# Patient Record
Sex: FEMALE | Race: WHITE | URBAN - METROPOLITAN AREA
[De-identification: names, ages, dates, MRNs, and addresses within clinical notes are randomized per-mention and may not be internally consistent; named-entity substitution may affect disease eponyms.]

---

## 2017-09-26 ENCOUNTER — TRANSFERRED RECORDS (OUTPATIENT)
Dept: HEALTH INFORMATION MANAGEMENT | Facility: CLINIC | Age: 42
End: 2017-09-26

## 2017-10-09 ENCOUNTER — TRANSFERRED RECORDS (OUTPATIENT)
Dept: HEALTH INFORMATION MANAGEMENT | Facility: CLINIC | Age: 42
End: 2017-10-09

## 2017-10-09 LAB
AST SERPL-CCNC: 27 U/L (ref 8–43)
CREAT SERPL-MCNC: 0.7 MG/DL (ref 0.6–1.1)
GFR SERPL CREATININE-BSD FRML MDRD: >60 ML/MIN/BSA
TSH SERPL-ACNC: 8 MIU/L (ref 0.3–4.2)

## 2017-11-03 ENCOUNTER — TRANSFERRED RECORDS (OUTPATIENT)
Dept: HEALTH INFORMATION MANAGEMENT | Facility: CLINIC | Age: 42
End: 2017-11-03

## 2018-07-09 ENCOUNTER — OFFICE VISIT (OUTPATIENT)
Dept: RHEUMATOLOGY | Facility: CLINIC | Age: 43
End: 2018-07-09
Payer: MEDICARE

## 2018-07-09 VITALS
BODY MASS INDEX: 37.84 KG/M2 | HEIGHT: 61 IN | WEIGHT: 200.4 LBS | OXYGEN SATURATION: 98 % | SYSTOLIC BLOOD PRESSURE: 130 MMHG | HEART RATE: 73 BPM | DIASTOLIC BLOOD PRESSURE: 86 MMHG | TEMPERATURE: 99.3 F

## 2018-07-09 DIAGNOSIS — M35.2 BEHCET'S DISEASE (H): Primary | ICD-10-CM

## 2018-07-09 PROBLEM — E03.9 HYPOTHYROIDISM: Status: ACTIVE | Noted: 2018-07-09

## 2018-07-09 PROBLEM — R00.2 PALPITATIONS: Status: ACTIVE | Noted: 2018-07-09

## 2018-07-09 PROBLEM — I47.10 PAROXYSMAL SUPRAVENTRICULAR TACHYCARDIA (H): Status: ACTIVE | Noted: 2018-07-09

## 2018-07-09 PROBLEM — E78.5 HYPERLIPIDEMIA: Status: ACTIVE | Noted: 2017-05-11

## 2018-07-09 LAB
BASOPHILS # BLD AUTO: 0 10E9/L (ref 0–0.2)
BASOPHILS NFR BLD AUTO: 0.1 %
DIFFERENTIAL METHOD BLD: NORMAL
EOSINOPHIL # BLD AUTO: 0.1 10E9/L (ref 0–0.7)
EOSINOPHIL NFR BLD AUTO: 1.6 %
ERYTHROCYTE [DISTWIDTH] IN BLOOD BY AUTOMATED COUNT: 12.7 % (ref 10–15)
ERYTHROCYTE [SEDIMENTATION RATE] IN BLOOD BY WESTERGREN METHOD: 13 MM/H (ref 0–20)
HCT VFR BLD AUTO: 43.1 % (ref 35–47)
HGB BLD-MCNC: 14.3 G/DL (ref 11.7–15.7)
LYMPHOCYTES # BLD AUTO: 2.2 10E9/L (ref 0.8–5.3)
LYMPHOCYTES NFR BLD AUTO: 32.9 %
MCH RBC QN AUTO: 29.7 PG (ref 26.5–33)
MCHC RBC AUTO-ENTMCNC: 33.2 G/DL (ref 31.5–36.5)
MCV RBC AUTO: 89 FL (ref 78–100)
MONOCYTES # BLD AUTO: 0.6 10E9/L (ref 0–1.3)
MONOCYTES NFR BLD AUTO: 9.1 %
NEUTROPHILS # BLD AUTO: 3.8 10E9/L (ref 1.6–8.3)
NEUTROPHILS NFR BLD AUTO: 56.3 %
PLATELET # BLD AUTO: 279 10E9/L (ref 150–450)
RBC # BLD AUTO: 4.82 10E12/L (ref 3.8–5.2)
WBC # BLD AUTO: 6.8 10E9/L (ref 4–11)

## 2018-07-09 PROCEDURE — 36415 COLL VENOUS BLD VENIPUNCTURE: CPT | Performed by: INTERNAL MEDICINE

## 2018-07-09 PROCEDURE — 86140 C-REACTIVE PROTEIN: CPT | Performed by: INTERNAL MEDICINE

## 2018-07-09 PROCEDURE — 99204 OFFICE O/P NEW MOD 45 MIN: CPT | Performed by: INTERNAL MEDICINE

## 2018-07-09 PROCEDURE — 84460 ALANINE AMINO (ALT) (SGPT): CPT | Performed by: INTERNAL MEDICINE

## 2018-07-09 PROCEDURE — 85025 COMPLETE CBC W/AUTO DIFF WBC: CPT | Performed by: INTERNAL MEDICINE

## 2018-07-09 PROCEDURE — 84450 TRANSFERASE (AST) (SGOT): CPT | Performed by: INTERNAL MEDICINE

## 2018-07-09 PROCEDURE — 82565 ASSAY OF CREATININE: CPT | Performed by: INTERNAL MEDICINE

## 2018-07-09 PROCEDURE — 85652 RBC SED RATE AUTOMATED: CPT | Performed by: INTERNAL MEDICINE

## 2018-07-09 RX ORDER — COLCHICINE 0.6 MG/1
TABLET ORAL
COMMUNITY
Start: 2018-02-12 | End: 2018-07-09

## 2018-07-09 RX ORDER — CHLORAL HYDRATE 500 MG
2000 CAPSULE ORAL
COMMUNITY
Start: 2017-05-11

## 2018-07-09 RX ORDER — LEVOTHYROXINE SODIUM 137 UG/1
137 TABLET ORAL
COMMUNITY
Start: 2018-02-13

## 2018-07-09 RX ORDER — PREGABALIN 75 MG/1
150 CAPSULE ORAL
COMMUNITY
Start: 2018-02-19

## 2018-07-09 RX ORDER — OXYCODONE AND ACETAMINOPHEN 10; 325 MG/1; MG/1
TABLET ORAL
COMMUNITY
Start: 2017-04-15

## 2018-07-09 RX ORDER — MODAFINIL 200 MG/1
200 TABLET ORAL
COMMUNITY
Start: 2017-08-22 | End: 2018-07-09

## 2018-07-09 RX ORDER — METAXALONE 800 MG/1
800 TABLET ORAL
COMMUNITY

## 2018-07-09 RX ORDER — ONDANSETRON 8 MG/1
TABLET, FILM COATED ORAL
COMMUNITY
Start: 2016-12-23

## 2018-07-09 RX ORDER — COLCHICINE 0.6 MG/1
0.6 TABLET ORAL DAILY
Qty: 3 TABLET | COMMUNITY
Start: 2018-07-09 | End: 2018-07-09

## 2018-07-09 RX ORDER — TRAMADOL HYDROCHLORIDE 50 MG/1
50 TABLET ORAL
COMMUNITY
Start: 2014-06-12

## 2018-07-09 RX ORDER — NORETHINDRONE ACETATE 5 MG
5 TABLET ORAL
COMMUNITY
Start: 2017-12-08

## 2018-07-09 RX ORDER — COLCHICINE 0.6 MG/1
0.6 TABLET ORAL 2 TIMES DAILY
Qty: 180 TABLET | Refills: 0 | COMMUNITY
Start: 2018-07-09

## 2018-07-09 RX ORDER — FENTANYL 25 UG/1
PATCH TRANSDERMAL
COMMUNITY
Start: 2017-04-15

## 2018-07-09 NOTE — NURSING NOTE
"No chief complaint on file.      Initial /86 (BP Location: Right arm, Patient Position: Chair, Cuff Size: Adult Large)  Pulse 73  Temp 99.3  F (37.4  C) (Oral)  Ht 1.549 m (5' 1\")  Wt 90.9 kg (200 lb 6.4 oz)  SpO2 98%  BMI 37.87 kg/m2 Estimated body mass index is 37.87 kg/(m^2) as calculated from the following:    Height as of this encounter: 1.549 m (5' 1\").    Weight as of this encounter: 90.9 kg (200 lb 6.4 oz).  Medication Reconciliation: complete    Have you ever seen a rheumatologist Yes  Who Dr. Joseph and  Dr Sanchez When (Jake Oct 2016) and Dr. Sanchez  11/2017   Joint pain history  Onset: Behcet's Disease has  Been 10 years Dr. Joseph retired looking for provider for maintenance purposes. Wakes up at night from numbness and nerve pain   Involved joints: all joints that moves around especially knees, hips and ankles  Pain scale:  5/10 with medication 9/10 without medication      Wakes the patient from sleep : Yes sometimes   Morning stiffness:Yes for 60  minutes  Meds used:Oxycoden, tramadol, fentanyl, skelaxin, and colchicine for ulcers and helps lessen duration of ulcers, lyrica as needed for nerve pain at night- groggy for nerve pain     Interim history  Since last visit:  1. Infections - No  2. New symptoms/medical problem - Yes low grade fever 99 last month and increased nerve pain, and pain on left side sometimes nerve, joints and sometimes muscles and lymph nodes. Waiting to schedule the Neurologist wanted to see you first   3. Any side effects from Rheum medications -Lyrica causes grogginess   3. ER visits/Hospitalizations/surgeries - No  4. Last PCP visit: Dr. Clement Nicole 2/12/18 at [facility]   Wt Readings from Last 4 Encounters:   07/09/18 90.9 kg (200 lb 6.4 oz)     BP Readings from Last 3 Encounters:   07/09/18 130/86       "

## 2018-07-09 NOTE — PROGRESS NOTES
[place] - Rheumatology Clinic Visit     Fozia Mclaughlin MRN# [id number]   YOB: 1975    Primary care provider: No primary care provider on file.  Jul 9, 2018          Assessment and Plan:   # Recurrent mouth and genital ulcers (since about age 17); fevers; joint pains; with diagnosis of Behcet's syndrome (Dx by Dr. Oseguera/Dr. Fleming 2010)  # Chronic colchicine therapy since 2010  # Fibromyalgia  # H/o trigeminal neuralgia  # H/o Grave's disease with previous radioactive iodine treatment; # Strongly Positive EFRAIN  # Indeterminate pulmonary nodules  # H/o tachycardia s/p ablation  # Chronic narcotic use    Lab results per Dr. Sanchez at [facility]:  RF, CCP and ANCA negative.  WAYNE , dsDNA antibody negative in past.     Nuclear joint scan: minimal periarticular uptake around hips.     Birth control pills has helped with monthly menstruation related joint pains and pelvic pain.     Chronic narcotic use (fentanyl, oxycodone,tramadol) for widespread joint and muscle pain since about 2009. Followed by [facility] pain clinic.     Last dose of prednisone and methotrexate (subcut) around 2016. Had side effects from these meds. Hydroxychloroquine was tried in the past; Side effects from that too.     Current disease activity: In the last 3 months, two mouth and vaginal sores episodes. But overall fatigue is better in the last few months. Colchicine 0.6mg PO daily. Increase colchicine to 0.6mg PO BID. If colchicine does not control her disease well, then otezla is an option.   She had used prednisone or medrol dose corinne PRN for flare ups. Patient do gets side effects of steroids.       The labs from patient records are reviewed.     I will be back in touch with the patient through mychart/letter when results are available.     Patient agrees with the above mentioned treatment plan.       There is no immunization history on file for this patient.    Orders Placed This Encounter   Procedures     CBC with platelets  differential     AST     ALT     Creatinine     Erythrocyte sedimentation rate auto     CRP inflammation       Return in about 6 weeks (around 8/20/2018).    Medications Discontinued During This Encounter   Medication Reason     colchicine (COLCYRS) 0.6 MG tablet Reorder     colchicine (COLCYRS) 0.6 MG tablet Reorder     modafinil (PROVIGIL) 200 MG tablet      Current Outpatient Prescriptions   Medication Sig Dispense Refill     Ca Carbonate-Mag Hydroxide 675-135 MG CHEW        colchicine (COLCYRS) 0.6 MG tablet Take 1 tablet (0.6 mg) by mouth 2 times daily 180 tablet 0     fentaNYL (DURAGESIC) 25 mcg/hr 72 hr patch APPLY 1 PATCH BY TRANSDERMAL ROUTE EVERY 48 HOURS       levothyroxine (SYNTHROID/LEVOTHROID) 137 MCG tablet Take 137 mcg by mouth       metaxalone (SKELAXIN) 800 MG tablet Take 800 mg by mouth       Misc. Devices (QUAD CANE) MISC Dispense one cane for home use.  Diagnosis: imbalance  781.2       norethindrone (AYGESTIN) 5 MG tablet Take 5 mg by mouth       Omega-3 1000 MG CAPS Take 2,000 mg by mouth       ondansetron (ZOFRAN) 8 MG tablet        oxyCODONE-acetaminophen (PERCOCET)  MG per tablet TAKE 1 TO 2 TABLETS BY MOUTH EVERY 4-6 HOURS AS NEEDED MAX 8/DAY       pregabalin (LYRICA) 75 MG capsule Take 150 mg by mouth       traMADol (ULTRAM) 50 MG tablet Take 50 mg by mouth       Sidney Miller MD  [facility]          Active Problem List:     Patient Active Problem List    Diagnosis Date Noted     Palpitations 07/09/2018     Priority: Medium     Paroxysmal supraventricular tachycardia (H) 07/09/2018     Priority: Medium     Hypothyroidism 07/09/2018     Priority: Medium     Hyperlipidemia 05/11/2017     Priority: Medium     Lung nodule < 6cm on CT 11/18/2015     Priority: Medium     Behcet's syndrome (H) 11/21/2012     Priority: Medium     SOB (shortness of breath) 03/08/2012     Priority: Medium     Overview:   Likely due to Vocal Cord Dysfunction       Vitamin D deficiency  03/08/2012     Priority: Medium     Overview:   Formatting of this note may be different from the original.  VITAMIN D TOTAL (ng/mL)   Date Value   8/30/2011 22.4*   8/2/2011 25.2*   4/27/2011 22.3*        Carpal tunnel syndrome 01/04/2008     Priority: Medium     Myalgia and myositis 01/04/2008     Priority: Medium     Chest pain 10/02/2007     Priority: Medium     Other premature beats 08/08/2007     Priority: Medium          History of Present Illness:   No chief complaint on file.    July 9, 2018  Have you ever seen a rheumatologist Yes  Who Dr. Joseph and  Dr Sanchez When (Jake Oct 2016) and Dr. Sanchez  11/2017   Joint pain history  Onset: Behcet's Disease has  Been 10 years Dr. Joseph retired looking for provider for maintenance purposes. Wakes up at night from numbness and nerve pain   Involved joints: all joints that moves around especially knees, hips and ankles  Pain scale:  5/10 with medication 9/10 without medication      Wakes the patient from sleep : Yes sometimes   Morning stiffness:Yes for 60  minutes  Meds used:Oxycoden, tramadol, fentanyl, skelaxin, and colchicine for ulcers and helps lessen duration of ulcers, lyrica as needed for nerve pain at night- groggy for nerve pain      Interim history  Since last visit:  1. Infections - No  2. New symptoms/medical problem - Yes low grade fever 99 last month and increased nerve pain, and pain on left side sometimes nerve, joints and sometimes muscles and lymph nodes. Waiting to schedule the Neurologist wanted to see you first   3. Any side effects from Rheum medications -Lyrica causes grogginess   3. ER visits/Hospitalizations/surgeries - No  4. Last PCP visit: Dr. Clement Nicole 2/12/18 at [facility]     Wt Readings from Last 4 Encounters:   07/09/18 90.9 kg (200 lb 6.4 oz)     Low grade fevers +ve on and off   skin photosensitivity  Dry mouth  shortness of breath, cough, chest pain - on and off    No h/o  rash, swollen glands  No  family or personal history of psoriasis, ulcerative colitis or chron's disease. No h/o iritis.   Patient denies any raynauds  No h/o persistent vomiting, diarrhea, abdominal pain  No h/o hematochezia, hematuria, hemoptysis  No h/o seizures  No h/o cancer    BP Readings from Last 3 Encounters:   07/09/18 130/86            Review of Systems:   Complete ROS negative except for symptoms mentioned in the HPI          Past Medical History:     Patient Active Problem List   Diagnosis     Behcet's syndrome (H)     Carpal tunnel syndrome     Chest pain     Myalgia and myositis     Hyperlipidemia     Lung nodule < 6cm on CT     Other premature beats     Palpitations     Paroxysmal supraventricular tachycardia (H)     SOB (shortness of breath)     Hypothyroidism     Vitamin D deficiency            Social History:     Social History     Occupational History     Not on file.     Social History Main Topics     Smoking status: Former Smoker     Smokeless tobacco: Never Used     Alcohol use Not on file     Drug use: Not on file     Sexual activity: Not on file            Family History:     Family History   Problem Relation Age of Onset     Arthritis Mother      Myocardial Infarction Father      HEART DISEASE Father      Cancer Paternal Grandmother      unknown     HEART DISEASE Paternal Grandfather             Allergies:     Allergies   Allergen Reactions     Allergy Decongestant Anxiety and Other (See Comments)     DO NOT GIVE     Diphenhydramine      Other reaction(s): Other (see comments)  w/Pseudophed- Anxiety     Hydrocodone-Acetaminophen Nausea     Naproxen Hives     Pseudoephedrine      Other reaction(s): Other (see comments)  w/ Diphenhydramine- Anxiety            Medications:     Current Outpatient Prescriptions   Medication Sig Dispense Refill     Ca Carbonate-Mag Hydroxide 675-135 MG CHEW        colchicine (COLCYRS) 0.6 MG tablet Take 1 tablet (0.6 mg) by mouth 2 times daily 180 tablet 0     fentaNYL (DURAGESIC) 25  "mcg/hr 72 hr patch APPLY 1 PATCH BY TRANSDERMAL ROUTE EVERY 48 HOURS       levothyroxine (SYNTHROID/LEVOTHROID) 137 MCG tablet Take 137 mcg by mouth       metaxalone (SKELAXIN) 800 MG tablet Take 800 mg by mouth       Misc. Devices (QUAD CANE) MISC Dispense one cane for home use.  Diagnosis: imbalance  781.2       norethindrone (AYGESTIN) 5 MG tablet Take 5 mg by mouth       Omega-3 1000 MG CAPS Take 2,000 mg by mouth       ondansetron (ZOFRAN) 8 MG tablet        oxyCODONE-acetaminophen (PERCOCET)  MG per tablet TAKE 1 TO 2 TABLETS BY MOUTH EVERY 4-6 HOURS AS NEEDED MAX 8/DAY       pregabalin (LYRICA) 75 MG capsule Take 150 mg by mouth       traMADol (ULTRAM) 50 MG tablet Take 50 mg by mouth              Physical Exam:   Blood pressure 130/86, pulse 73, temperature 99.3  F (37.4  C), temperature source Oral, height 1.549 m (5' 1\"), weight 90.9 kg (200 lb 6.4 oz), SpO2 98 %.  Wt Readings from Last 4 Encounters:   07/09/18 90.9 kg (200 lb 6.4 oz)       Constitutional: well-developed, appearing stated age; cooperative  Eyes: normal conjunctiva, sclera  ENT: nl external ears, nose, lips.No mucous membrane lesions, normal saliva pool  Neck: no cervical lymphadenopathy; mild tenderness in lower end of sternoclavicular muscles  Resp: lungs clear to auscultation in the bases,   CV: RRR, no added sounds  GI: Abdomen soft and no tenderness  : not tested  Lymph: no cervical, supraclavicular or epitrochlear nodes  MS: All shoulder, elbow, wrist, MCP/PIP/DIP, hip, knee, ankle, and foot MTP/IP joints were examined and  found without active synovitis or major deformity. Full ROM.  No dactylitis,  tenosynovitis, enthesopathy.  Skin: no rash in exposed areas  Psych: nl judgement, orientation, memory, affect.         Data:         Sidney Miller MD    [facility]    "

## 2018-07-09 NOTE — MR AVS SNAPSHOT
"              After Visit Summary   2018    Fozia Mclaughlin    MRN: [id number]           Patient Information     Date Of Birth          1975        Visit Information        Provider Department      2018 2:00 PM Sidney Miller MD [facility]        Today's Diagnoses     Behcet's disease (H)    -  1       Follow-ups after your visit        Follow-up notes from your care team     Return in about 6 weeks (around 2018).      Who to contact     If you have questions or need follow up information about today's clinic visit or your schedule please contact [facility] directly at [phone].  Normal or non-critical lab and imaging results will be communicated to you by MyChart, letter or phone within 4 business days after the clinic has received the results. If you do not hear from us within 7 days, please contact the clinic through MyChart or phone. If you have a critical or abnormal lab result, we will notify you by phone as soon as possible.  Submit refill requests through [employer] or call your pharmacy and they will forward the refill request to us. Please allow 3 business days for your refill to be completed.          Additional Information About Your Visit        MyChart Information     [email or web address] lets you send messages to your doctor, view your test results, renew your prescriptions, schedule appointments and more. To sign up, go to [email or web address] . Click on \"Log in\" on the left side of the screen, which will take you to the Welcome page. Then click on \"Sign up Now\" on the right side of the page.     You will be asked to enter the access code listed below, as well as some personal information. Please follow the directions to create your username and password.     Your access code is: [email or web address]  Expires: 10/7/2018  1:50 PM     Your access code will  in 90 days. If you need help or a new code, please call your [facility] or [phone].        Care " "EveryWhere ID     This is your Care EveryWhere ID. This could be used by other organizations to access your [facility] medical records  [id number]        Your Vitals Were     Pulse Temperature Height Pulse Oximetry BMI (Body Mass Index)       73 99.3  F (37.4  C) (Oral) 1.549 m (5' 1\") 98% 37.87 kg/m2        Blood Pressure from Last 3 Encounters:   07/09/18 130/86    Weight from Last 3 Encounters:   07/09/18 90.9 kg (200 lb 6.4 oz)              We Performed the Following     ALT     AST     CBC with platelets differential     Creatinine     CRP inflammation     Erythrocyte sedimentation rate auto          Today's Medication Changes          These changes are accurate as of 7/9/18  3:23 PM.  If you have any questions, ask your nurse or doctor.               Start taking these medicines.        Dose/Directions    colchicine 0.6 MG tablet   Commonly known as:  COLCYRS   Started by:  Sidney Miller MD        Dose:  0.6 mg   Take 1 tablet (0.6 mg) by mouth 2 times daily   Quantity:  180 tablet   Refills:  0         Stop taking these medicines if you haven't already. Please contact your care team if you have questions.     modafinil 200 MG tablet   Commonly known as:  PROVIGIL   Stopped by:  Sidney Miller MD                   Information about OPIOIDS     PRESCRIPTION OPIOIDS: WHAT YOU NEED TO KNOW   We gave you an opioid (narcotic) pain medicine. It is important to manage your pain, but opioids are not always the best choice. You should first try all the other options your care team gave you. Take this medicine for as short a time (and as few doses) as possible.     These medicines have risks:    DO NOT drive when on new or higher doses of pain medicine. These medicines can affect your alertness and reaction times, and you could be arrested for driving under the influence (DUI). If you need to use opioids long-term, talk to your care team about driving.    DO NOT operate heave machinery    DO NOT " do any other dangerous activities while taking these medicines.     DO NOT drink any alcohol while taking these medicines.      If the opioid prescribed includes acetaminophen, DO NOT take with any other medicines that contain acetaminophen. Read all labels carefully. Look for the word  acetaminophen  or  Tylenol.  Ask your pharmacist if you have questions or are unsure.    You can get addicted to pain medicines, especially if you have a history of addiction (chemical, alcohol or substance dependence). Talk to your care team about ways to reduce this risk.    Store your pills in a secure place, locked if possible. We will not replace any lost or stolen medicine. If you don t finish your medicine, please throw away (dispose) as directed by your pharmacist. The [place] Pollution Control Agency has more information about safe disposal: https://www.pca.[facility].[place].us/living-green/managing-unwanted-medications.     All opioids tend to cause constipation. Drink plenty of water and eat foods that have a lot of fiber, such as fruits, vegetables, prune juice, apple juice and high-fiber cereal. Take a laxative (Miralax, milk of magnesia, Colace, Senna) if you don t move your bowels at least every other day.          Primary Care Provider    None Specified       No primary provider on file.        Equal Access to Services     MARKUS CERRATO : Hadcarola Lantigua, waisabellada laith, qaybta kaalmada austin, amee neal. So [facility] [phone].    ATENCIÓN: Si habla español, tiene a rowe disposición servicios gratuitos de asistencia lingüística. Llame al [phone].    We comply with applicable federal civil rights laws and [place] laws. We do not discriminate on the basis of race, color, national origin, age, disability, sex, sexual orientation, or gender identity.            Thank you!     Thank you for choosing [facility]  for your care. Our goal is always to provide you with  excellent care. Hearing back from our patients is one way we can continue to improve our services. Please take a few minutes to complete the written survey that you may receive in the mail after your visit with us. Thank you!             Your Updated Medication List - Protect others around you: Learn how to safely use, store and throw away your medicines at www.disposemymeds.org.          This list is accurate as of 7/9/18  3:23 PM.  Always use your most recent med list.                   Brand Name Dispense Instructions for use Diagnosis    Ca Carbonate-Mag Hydroxide 675-135 MG Chew           colchicine 0.6 MG tablet    COLCYRS    180 tablet    Take 1 tablet (0.6 mg) by mouth 2 times daily        fentaNYL 25 mcg/hr 72 hr patch    DURAGESIC     APPLY 1 PATCH BY TRANSDERMAL ROUTE EVERY 48 HOURS        levothyroxine 137 MCG tablet    SYNTHROID/LEVOTHROID     Take 137 mcg by mouth        LYRICA 75 MG capsule   Generic drug:  pregabalin      Take 150 mg by mouth        metaxalone 800 MG tablet    SKELAXIN     Take 800 mg by mouth        norethindrone 5 MG tablet    AYGESTIN     Take 5 mg by mouth        Omega-3 1000 MG Caps      Take 2,000 mg by mouth        ondansetron 8 MG tablet    ZOFRAN          oxyCODONE-acetaminophen  MG per tablet    PERCOCET     TAKE 1 TO 2 TABLETS BY MOUTH EVERY 4-6 HOURS AS NEEDED MAX 8/DAY        Quad Cane Misc      Dispense one cane for home use.  Diagnosis: imbalance  781.2        traMADol 50 MG tablet    ULTRAM     Take 50 mg by mouth

## 2018-07-09 NOTE — LETTER
[facility]  [address] [address]  [phone]      7/12/2018       Fozia Mclaughlin  [address]   [place] [address]        Dear Fozia,    Your Basic blood cell counts, liver and kidney function labs within normal limits   CRP is borderline high but reassuring that sed rate is normal.   I will explain these when we meet.     Sincerely,      Sidney Miller MD   Rheumatology

## 2018-07-10 LAB
ALT SERPL W P-5'-P-CCNC: 25 U/L (ref 0–50)
AST SERPL W P-5'-P-CCNC: 21 U/L (ref 0–45)
CREAT SERPL-MCNC: 0.78 MG/DL (ref 0.52–1.04)
CRP SERPL-MCNC: 8.1 MG/L (ref 0–8)
GFR SERPL CREATININE-BSD FRML MDRD: 80 ML/MIN/1.7M2

## 2018-07-12 NOTE — PROGRESS NOTES
Please send a letter to the patient with a copy of the lab results and also the following note:  Basic blood cell counts, liver and kidney function labs within normal limits  CRP is borderline high but reassuring that sed rate is normal.   I will explain these when we meet.

## 2019-04-09 ENCOUNTER — MEDICAL CORRESPONDENCE (OUTPATIENT)
Dept: HEALTH INFORMATION MANAGEMENT | Facility: CLINIC | Age: 44
End: 2019-04-09

## 2019-04-15 ENCOUNTER — TELEPHONE (OUTPATIENT)
Dept: OPHTHALMOLOGY | Facility: CLINIC | Age: 44
End: 2019-04-15

## 2019-04-15 NOTE — TELEPHONE ENCOUNTER
4/9/19 [facility] referral from Dr Dominic Osgeuera at [facility] in [place] for behcet's disease with blurry vision. Got confirmation from Mckyala at Eye clinic to schedule with [facility] or [facility] instead.  4/15 [facility] for pt to call back  -LMK

## 2019-05-13 ENCOUNTER — OFFICE VISIT (OUTPATIENT)
Dept: OPHTHALMOLOGY | Facility: CLINIC | Age: 44
End: 2019-05-13
Attending: OPHTHALMOLOGY
Payer: MEDICARE

## 2019-05-13 DIAGNOSIS — H53.10 SUBJECTIVE VISUAL DISTURBANCE: Primary | ICD-10-CM

## 2019-05-13 DIAGNOSIS — H53.10 SUBJECTIVE VISUAL DISTURBANCE: ICD-10-CM

## 2019-05-13 PROCEDURE — G0463 HOSPITAL OUTPT CLINIC VISIT: HCPCS | Mod: ZF

## 2019-05-13 SDOH — HEALTH STABILITY: MENTAL HEALTH: HOW OFTEN DO YOU HAVE A DRINK CONTAINING ALCOHOL?: NEVER

## 2019-05-13 ASSESSMENT — TONOMETRY
OS_IOP_MMHG: 22
IOP_METHOD: ICARE
OD_IOP_MMHG: 17

## 2019-05-13 ASSESSMENT — VISUAL ACUITY
OD_SC: 20/30
OS_SC: 20/20
OD_PH_SC: 20/25
METHOD: SNELLEN - LINEAR
OS_SC+: -3
CORRECTION_TYPE: GLASSES

## 2019-05-13 NOTE — NURSING NOTE
No chief complaint on file.     Chief Complaint(s) and History of Present Illness(es)     New patient evaluation of subjective visual disturbance.  Blurred vision.  Says she blurs really quickly.  Changes from day to day.  Sees floaters. No flashes.  Occasionally shoots of pain in left eye.  Has flare ups a few times monthly.  Complains of low grade fever and joint pain everyday.  Eye meds:  None  MARNIE Coats 5/13/2019 1:49 PM

## 2019-05-13 NOTE — PROGRESS NOTES
Fozia Mclaughlin is a 43 year old female with the following diagnoses:   1. Subjective visual disturbance         Patient left the clinic before being seen.       Fozia Mclaughlin was referred by Dr. Dominic Oseguera (Rheumatology) due to history of Behcets and Graves.    HPI  Vision blurs throughout the day and aligns with worsening joint pain from her Behcets. This has become more frequent and limits her ability to read for long periods of time. She uses readers and a weak prescription. Blurring remits spontaneously but has to stop doing near work to avoid feeling eye fatigue. Denies eye pain, washout of color, and double vision. For the past 6 years, her eyes have felt more progressivelt fatigued even using her readers. She denies eye redness and photophobia.  She denies eyelid retraction, proptosis, and double vision.      Behcet's: low grade fever constantly, oral and genital orals, morning stiffness, arthralgia    Feels intermittent paresthesias and numbness of hands and feet, has involved arms and legs. Denies these symptoms today but there are days when these symptoms are constant. These wake up her up at night and she feels pain.   Denies [place], urinary or bowel incontinence, dysphagia, hicupping attacks or intractable nausea/vomiting (only due to medications)     Last MRI 11/12/2012 which was normal.        Past medical history: Behcet's (Colchicine), Graves (s/p radioactive iodine in 1998), FIbromyalgia   Past ocular history: No diagnoses   Current medications: colchicine 0.6 mg BID, levothyroxine 137 mcg daily, pregabalin (not tolerant of gabapentin), tramadol, percocet     Imaging and Labs  - Most recent TSH (01/21/2019): 0.69   - ESR 23, CRP 0.82     - MRI brain 11/12/2012  There are no areas of restricted diffusion to suggest the   presence of an acute infarct. There is no intracranial hemorrhage, midline   shift, or increased mass effect. There is no hydrocephalus and ventricles   are normal in size. No  extra-axial collection is visualized. Visualized   intracranial flow-voids are preserved. There is no evidence for crowding   of structures at the craniocervical junction. No air-fluid levels are seen   in the visualized paranasal sinuses. Mild lobulated mucosal thickening   and/or mucus retention cyst is seen in the left maxillary sinus   inferiorly. Minimal mucosal thickening is present in the ethmoid air   cells. Mastoid air cells are clear.     Ocular examination:  Visual acuity 20/30 in RIGHT eye and 20/20-3 in LEFT eye. There is no afferent pupillary defect. Color plates are full  Confrontational visual field  Extraocular movements: full, small exophoria   Anterior segment exam: no cell or flare  Fundus exam    Assessment & Plan   It is my impression that Fozia Mclaughlin has Behcet's and Graves disease.  It doesn't sound like she has had uveitis or Thyroid eye disease in the past.  Today, she does not have either.  Her symptoms are most consistent with Dry eye syndrome.    I asked the patient to use artificial tears as well as warm compresses to the eyelid margin  on a regular basis.  I asked the patient to take fish oil capsules 2x/day for a month and then 1x/d thereafter.   If that is not beneficial we could consider punctal plugs, corticosteroid eye drops and Restasis.     Patient left without being seen by Duarte Mo MD.  Patient saw the medical student only.          Attending Physician Attestation:  Complete documentation of historical and exam elements from today's encounter can be found in the full encounter summary report (not reduplicated in this progress note).  I personally obtained the chief complaint(s) and history of present illness.  I confirmed and edited as necessary the review of systems, past medical/surgical history, family history, social history, and examination findings as documented by others; and I examined the patient myself.  I personally reviewed the relevant tests, images, and  reports as documented above.  I formulated and edited as necessary the assessment and plan and discussed the findings and management plan with the patient and family. - Duarte Mo MD    The patient was assessed by the Neuro-Ophthalmology attending, Dr. Duarte Mo, and Ophthalmology resident.  Manan Glez, medical student year 4

## 2019-05-13 NOTE — PATIENT INSTRUCTIONS
You have been diagnosed with Dry eye syndrome.  Symptoms of Dry eye syndrome can include double vision, eye pain, blurry vision, stinging, burning, tearing, eye redness.      Some useful instructions are listed below:     1.  Use artificial tears on a regular basis.  If you use artificial tear drops with preservative, you can use them up to 4-6 times per day. If you use artificial tear drops without preservatives, then you can use them more often.      2.  Wash your eyelashes with hot water.  Do not make the water so hot that you burn yourself, but the water should be more than just warm.  Often patients will wash their eyelashes in the shower under the hot water.  You should rub gently along the base of your eyelashes.      3.  Taking fish oil capsules twice a day for a month and then once a day after that can help improve Dry eye symptoms.      4.  Drink a lot of water.  Hydration can be helpful.      5.  Humidify your environment.      6.  If this is not beneficial, other treatments can include punctal plugs or cautery, corticosteroid eye drops, Restasis, Lipiflow, or autologous serum.  If you are still struggling with dry eye symptoms, call Dr. Mo's office for other therapies or a referral to a dry eye specialist.

## 2019-05-14 ENCOUNTER — TELEPHONE (OUTPATIENT)
Dept: OPHTHALMOLOGY | Facility: CLINIC | Age: 44
End: 2019-05-14

## 2019-05-14 NOTE — TELEPHONE ENCOUNTER
This writer called the patient's mobile number and left a message asking patient to get back in touch with the eye clinic to complete her appointment. She left in the middle of her visit 05/13/2019 after telling the office staff she needed to use the restroom. The office staff attempted to locate her and call her that afternoon but could not reach her.

## 2019-05-14 NOTE — TELEPHONE ENCOUNTER
lvm asking patient if everything were ok and to please call back to let us know since she left during the visit without being seen.

## 2019-06-03 ENCOUNTER — PRE VISIT (OUTPATIENT)
Dept: NEUROLOGY | Facility: CLINIC | Age: 44
End: 2019-06-03

## 2019-06-03 NOTE — TELEPHONE ENCOUNTER
FUTURE VISIT INFORMATION      FUTURE VISIT INFORMATION:    Date: 6/3/2019    Time: 11AM    Location: [facility]  REFERRAL INFORMATION:    Referring provider:  Dr. Oseguera    Referring providers clinic:  [facility]    Reason for visit/diagnosis  Limb Numbness/Headaches     RECORDS REQUESTED FROM:       Clinic name Comments Records Status Imaging Status   [facility]   Care Everywhere  N/A

## 2019-10-02 ENCOUNTER — HEALTH MAINTENANCE LETTER (OUTPATIENT)
Age: 44
End: 2019-10-02

## 2019-12-15 ENCOUNTER — HEALTH MAINTENANCE LETTER (OUTPATIENT)
Age: 44
End: 2019-12-15

## 2021-01-15 ENCOUNTER — HEALTH MAINTENANCE LETTER (OUTPATIENT)
Age: 46
End: 2021-01-15

## 2021-01-24 ENCOUNTER — HEALTH MAINTENANCE LETTER (OUTPATIENT)
Age: 46
End: 2021-01-24

## 2021-09-04 ENCOUNTER — HEALTH MAINTENANCE LETTER (OUTPATIENT)
Age: 46
End: 2021-09-04

## 2022-02-19 ENCOUNTER — HEALTH MAINTENANCE LETTER (OUTPATIENT)
Age: 47
End: 2022-02-19

## 2022-10-22 ENCOUNTER — HEALTH MAINTENANCE LETTER (OUTPATIENT)
Age: 47
End: 2022-10-22

## 2023-04-01 ENCOUNTER — HEALTH MAINTENANCE LETTER (OUTPATIENT)
Age: 48
End: 2023-04-01